# Patient Record
Sex: MALE | Race: WHITE | NOT HISPANIC OR LATINO | Employment: FULL TIME | URBAN - METROPOLITAN AREA
[De-identification: names, ages, dates, MRNs, and addresses within clinical notes are randomized per-mention and may not be internally consistent; named-entity substitution may affect disease eponyms.]

---

## 2022-08-13 ENCOUNTER — HOSPITAL ENCOUNTER (EMERGENCY)
Facility: HOSPITAL | Age: 22
Discharge: HOME | End: 2022-08-13
Attending: EMERGENCY MEDICINE | Admitting: EMERGENCY MEDICINE

## 2022-08-13 ENCOUNTER — APPOINTMENT (OUTPATIENT)
Dept: GENERAL RADIOLOGY | Facility: HOSPITAL | Age: 22
End: 2022-08-13

## 2022-08-13 VITALS
SYSTOLIC BLOOD PRESSURE: 104 MMHG | BODY MASS INDEX: 18.56 KG/M2 | RESPIRATION RATE: 22 BRPM | HEIGHT: 72 IN | WEIGHT: 137 LBS | TEMPERATURE: 98.1 F | HEART RATE: 84 BPM | OXYGEN SATURATION: 94 % | DIASTOLIC BLOOD PRESSURE: 71 MMHG

## 2022-08-13 DIAGNOSIS — R06.02 SHORTNESS OF BREATH: ICD-10-CM

## 2022-08-13 DIAGNOSIS — F10.920 ALCOHOLIC INTOXICATION WITHOUT COMPLICATION: Primary | ICD-10-CM

## 2022-08-13 DIAGNOSIS — Z87.09 HISTORY OF ASTHMA: ICD-10-CM

## 2022-08-13 LAB
HOLD SPECIMEN: NORMAL
WHOLE BLOOD HOLD COAG: NORMAL
WHOLE BLOOD HOLD SPECIMEN: NORMAL

## 2022-08-13 PROCEDURE — 71045 X-RAY EXAM CHEST 1 VIEW: CPT

## 2022-08-13 PROCEDURE — 99283 EMERGENCY DEPT VISIT LOW MDM: CPT

## 2022-08-13 RX ORDER — SODIUM CHLORIDE 0.9 % (FLUSH) 0.9 %
10 SYRINGE (ML) INJECTION AS NEEDED
Status: DISCONTINUED | OUTPATIENT
Start: 2022-08-13 | End: 2022-08-13 | Stop reason: HOSPADM

## 2022-08-13 NOTE — ED PROVIDER NOTES
"Subjective   22-year-old male presents for evaluation of shortness of breath, now resolved.  Of note, the patient admits to drinking heavily last night.  This morning at around 5 AM he laid down on top of his partner and began breathing heavily.  He has a history of asthma and she was concerned about a potential asthma attack, prompting her to bring him to the emergency department.  She does note that she feels that anxiety could be contributing to his current symptoms.  No recent cough or fever.  He used his albuterol inhaler prior to coming to the emergency department.  Currently, the patient feels well and has no complaints.  He tells me that he \"is ready to go home.  He is not suicidal or homicidal.  He is accompanied by his girlfriend.          Review of Systems   Respiratory: Positive for shortness of breath.    All other systems reviewed and are negative.      No past medical history on file.    Not on File    No past surgical history on file.    No family history on file.    Social History     Socioeconomic History   • Marital status: Single           Objective   Physical Exam  Vitals and nursing note reviewed.   Constitutional:       General: He is not in acute distress.     Appearance: He is well-developed. He is not diaphoretic.      Comments: Nontoxic-appearing male   HENT:      Head: Normocephalic and atraumatic.   Neck:      Vascular: No JVD.   Cardiovascular:      Rate and Rhythm: Normal rate and regular rhythm.      Heart sounds: Normal heart sounds. No murmur heard.    No friction rub. No gallop.   Pulmonary:      Effort: Pulmonary effort is normal. No respiratory distress.      Breath sounds: Normal breath sounds. No wheezing or rales.   Abdominal:      General: Bowel sounds are normal. There is no distension.      Palpations: Abdomen is soft. There is no mass.      Tenderness: There is no abdominal tenderness. There is no guarding.   Musculoskeletal:         General: Normal range of motion.      " Cervical back: Normal range of motion.   Skin:     General: Skin is warm and dry.      Coloration: Skin is not pale.      Findings: No erythema or rash.   Neurological:      General: No focal deficit present.      Mental Status: He is alert and oriented to person, place, and time.   Psychiatric:         Thought Content: Thought content normal.         Judgment: Judgment normal.         Procedures           ED Course  ED Course as of 08/13/22 0714   Sat Aug 13, 2022   0648 22-year-old male presents for evaluation of shortness of breath, now resolved.  At around 5 AM, the patient had been drinking heavily when he came and laid down on top of his partner.  He then began breathing heavily and she was concerned about a potential asthma attack, prompting his visit to the emergency department.  In triage, the patient was tachycardic, anxious, and breathing rapidly but by the time he got back to the room his symptoms had completely resolved.  Upon my evaluation, the patient is asymptomatic and without complaint.  He has normal work of breathing.  His heart rate is in the 80s.  His lungs are clear.  Oxygen saturations are 98% on room air.  He is requesting to leave and does not want any further work-up.  I was able to convince him to at least allow me to get a chest x-ray which was unrevealing. [DD]   0650 I personally viewed the patient's x-ray images myself, and I am in agreement with the radiologist's reading for final interpretation.     [DD]   0650 I do feel that there was likely a component of anxiety causing his symptoms this morning in addition to his alcohol consumption.  However, at this time based on his overall clinical presentation, I feel that further work-up is unnecessary and doubt emergent cardiothoracic process at this time.  Patient reassured.  He will follow-up with his primary care physician within the next week.  Agreeable with plan and given appropriate strict return precautions. [DD]      ED Course  "User Index  [DD] Dereck Vera MD                                          Recent Results (from the past 24 hour(s))   Green Top (Gel)    Collection Time: 08/13/22  5:38 AM   Result Value Ref Range    Extra Tube Hold for add-ons.    Lavender Top    Collection Time: 08/13/22  5:38 AM   Result Value Ref Range    Extra Tube hold for add-on    Gold Top - SST    Collection Time: 08/13/22  5:38 AM   Result Value Ref Range    Extra Tube Hold for add-ons.    Light Blue Top    Collection Time: 08/13/22  5:38 AM   Result Value Ref Range    Extra Tube Hold for add-ons.      Note: In addition to lab results from this visit, the labs listed above may include labs taken at another facility or during a different encounter within the last 24 hours. Please correlate lab times with ED admission and discharge times for further clarification of the services performed during this visit.    XR Chest 1 View   Final Result   No acute cardiopulmonary abnormality.      Electronically signed by:  Diane Montana     8/13/2022 4:31 AM Mountain Time        Vitals:    08/13/22 0535 08/13/22 0619 08/13/22 0625   BP: 123/79  104/71   BP Location: Right arm     Pulse: 113 84    Resp: 22     Temp: 98.1 °F (36.7 °C)     TempSrc: Oral     SpO2: 98% 94%    Weight: 62.1 kg (137 lb)     Height: 182.9 cm (72\")       Medications   sodium chloride 0.9 % flush 10 mL (has no administration in time range)     ECG/EMG Results (last 24 hours)     ** No results found for the last 24 hours. **        No orders to display           MDM    Final diagnoses:   Alcoholic intoxication without complication (HCC)   Shortness of breath   History of asthma       ED Disposition  ED Disposition     ED Disposition   Discharge    Condition   Stable    Comment   --             PATIENT CONNECTION - [facility] [id number]  [phone]  In 1 week           Medication List      No changes were made to your prescriptions during this visit.          Dereck Vera, " MD  08/13/22 0718